# Patient Record
Sex: FEMALE | Race: BLACK OR AFRICAN AMERICAN | NOT HISPANIC OR LATINO | Employment: PART TIME | URBAN - METROPOLITAN AREA
[De-identification: names, ages, dates, MRNs, and addresses within clinical notes are randomized per-mention and may not be internally consistent; named-entity substitution may affect disease eponyms.]

---

## 2023-11-22 ENCOUNTER — OFFICE VISIT (OUTPATIENT)
Dept: FAMILY MEDICINE | Facility: CLINIC | Age: 33
End: 2023-11-22
Payer: COMMERCIAL

## 2023-11-22 VITALS
HEART RATE: 88 BPM | HEIGHT: 67 IN | RESPIRATION RATE: 23 BRPM | SYSTOLIC BLOOD PRESSURE: 123 MMHG | TEMPERATURE: 98.6 F | BODY MASS INDEX: 44.57 KG/M2 | WEIGHT: 284 LBS | OXYGEN SATURATION: 98 % | DIASTOLIC BLOOD PRESSURE: 76 MMHG

## 2023-11-22 DIAGNOSIS — Z13.220 SCREENING FOR LIPID DISORDERS: ICD-10-CM

## 2023-11-22 DIAGNOSIS — G89.29 CHRONIC PAIN OF LEFT KNEE: ICD-10-CM

## 2023-11-22 DIAGNOSIS — Z12.4 CERVICAL CANCER SCREENING: ICD-10-CM

## 2023-11-22 DIAGNOSIS — Z00.00 ROUTINE GENERAL MEDICAL EXAMINATION AT A HEALTH CARE FACILITY: Primary | ICD-10-CM

## 2023-11-22 DIAGNOSIS — Z11.59 NEED FOR HEPATITIS C SCREENING TEST: ICD-10-CM

## 2023-11-22 DIAGNOSIS — M25.562 CHRONIC PAIN OF LEFT KNEE: ICD-10-CM

## 2023-11-22 DIAGNOSIS — Z11.4 SCREENING FOR HIV (HUMAN IMMUNODEFICIENCY VIRUS): ICD-10-CM

## 2023-11-22 LAB
ALBUMIN SERPL BCG-MCNC: 4 G/DL (ref 3.5–5.2)
ALP SERPL-CCNC: 78 U/L (ref 40–150)
ALT SERPL W P-5'-P-CCNC: 20 U/L (ref 0–50)
ANION GAP SERPL CALCULATED.3IONS-SCNC: 8 MMOL/L (ref 7–15)
AST SERPL W P-5'-P-CCNC: 23 U/L (ref 0–45)
BILIRUB SERPL-MCNC: 0.4 MG/DL
BUN SERPL-MCNC: 9.3 MG/DL (ref 6–20)
CALCIUM SERPL-MCNC: 9.2 MG/DL (ref 8.6–10)
CHLORIDE SERPL-SCNC: 103 MMOL/L (ref 98–107)
CHOLEST SERPL-MCNC: 194 MG/DL
CREAT SERPL-MCNC: 0.87 MG/DL (ref 0.51–0.95)
DEPRECATED HCO3 PLAS-SCNC: 26 MMOL/L (ref 22–29)
EGFRCR SERPLBLD CKD-EPI 2021: 89 ML/MIN/1.73M2
ERYTHROCYTE [DISTWIDTH] IN BLOOD BY AUTOMATED COUNT: 13.1 % (ref 10–15)
GLUCOSE SERPL-MCNC: 82 MG/DL (ref 70–99)
HCT VFR BLD AUTO: 41.8 % (ref 35–47)
HCV AB SERPL QL IA: NONREACTIVE
HDLC SERPL-MCNC: 41 MG/DL
HGB BLD-MCNC: 13.1 G/DL (ref 11.7–15.7)
LDLC SERPL CALC-MCNC: 130 MG/DL
MCH RBC QN AUTO: 28.4 PG (ref 26.5–33)
MCHC RBC AUTO-ENTMCNC: 31.3 G/DL (ref 31.5–36.5)
MCV RBC AUTO: 91 FL (ref 78–100)
NONHDLC SERPL-MCNC: 153 MG/DL
PLATELET # BLD AUTO: 304 10E3/UL (ref 150–450)
POTASSIUM SERPL-SCNC: 3.9 MMOL/L (ref 3.4–5.3)
PROT SERPL-MCNC: 7.2 G/DL (ref 6.4–8.3)
RBC # BLD AUTO: 4.61 10E6/UL (ref 3.8–5.2)
SODIUM SERPL-SCNC: 137 MMOL/L (ref 135–145)
TRIGL SERPL-MCNC: 113 MG/DL
WBC # BLD AUTO: 5.7 10E3/UL (ref 4–11)

## 2023-11-22 PROCEDURE — 87389 HIV-1 AG W/HIV-1&-2 AB AG IA: CPT

## 2023-11-22 PROCEDURE — 99213 OFFICE O/P EST LOW 20 MIN: CPT | Mod: 25

## 2023-11-22 PROCEDURE — 87624 HPV HI-RISK TYP POOLED RSLT: CPT

## 2023-11-22 PROCEDURE — 80053 COMPREHEN METABOLIC PANEL: CPT

## 2023-11-22 PROCEDURE — 86803 HEPATITIS C AB TEST: CPT

## 2023-11-22 PROCEDURE — 99385 PREV VISIT NEW AGE 18-39: CPT

## 2023-11-22 PROCEDURE — 85027 COMPLETE CBC AUTOMATED: CPT

## 2023-11-22 PROCEDURE — G0145 SCR C/V CYTO,THINLAYER,RESCR: HCPCS

## 2023-11-22 PROCEDURE — 36415 COLL VENOUS BLD VENIPUNCTURE: CPT

## 2023-11-22 PROCEDURE — 80061 LIPID PANEL: CPT

## 2023-11-22 ASSESSMENT — ENCOUNTER SYMPTOMS
SORE THROAT: 0
DIARRHEA: 0
HEARTBURN: 0
NAUSEA: 0
DIZZINESS: 0
CHILLS: 0
ARTHRALGIAS: 1
ABDOMINAL PAIN: 0
SHORTNESS OF BREATH: 0
COUGH: 0
NERVOUS/ANXIOUS: 0
FEVER: 0
DYSURIA: 0
JOINT SWELLING: 0
MYALGIAS: 1
HEMATOCHEZIA: 0
HEMATURIA: 0
PALPITATIONS: 0
HEADACHES: 0
CONSTIPATION: 0
PARESTHESIAS: 0
BREAST MASS: 0
WEAKNESS: 0
FREQUENCY: 0
EYE PAIN: 0

## 2023-11-22 ASSESSMENT — PAIN SCALES - GENERAL: PAINLEVEL: SEVERE PAIN (7)

## 2023-11-22 NOTE — LETTER
November 27, 2023      Will Gomez  [address]   [place] [address]        Dear MsElva,    We are writing to inform you of your test results.    Will,  Your blood counts, electrolytes, kidney function, and liver function labs are all normal.    Your LDL cholesterol lab slightly elevated. Work on eating healthy fats (olive oil, avocados, fish) and increasing fiber in your diet. Foods like vegetables, fruit, omega-3 fatty acids and organic, lean proteins are important parts of a healthy diet. The more colorful your food is, the healthier it is for you.     Exercise will help with this too. It is recommended that adults exercise at least 150 minutes/week at continuous 10 minutes or more intervals of moderate physical activity. Consider including cardiovascular exercise (walking, running, dancing, etc.), resistance training (use of weights, resistance bands, body weight, etc.) and stretching into your exercise routine.         Resulted Orders   HIV Antigen Antibody Combo   Result Value Ref Range    HIV Antigen Antibody Combo Nonreactive Nonreactive      Comment:      HIV-1 p24 Ag & HIV-1/HIV-2 Ab Not Detected   Hepatitis C Screen Reflex to HCV RNA Quant and Genotype   Result Value Ref Range    Hepatitis C Antibody Nonreactive Nonreactive    Narrative    Assay performance characteristics have not been established for newborns, infants, and children.   Lipid panel reflex to direct LDL Fasting   Result Value Ref Range    Cholesterol 194 <200 mg/dL    Triglycerides 113 <150 mg/dL    Direct Measure HDL 41 (L) >=50 mg/dL    LDL Cholesterol Calculated 130 (H) <=100 mg/dL    Non HDL Cholesterol 153 (H) <130 mg/dL    Narrative    Cholesterol  Desirable:  <200 mg/dL    Triglycerides  Normal:  Less than 150 mg/dL  Borderline High:  150-199 mg/dL  High:  200-499 mg/dL  Very High:  Greater than or equal to 500 mg/dL    Direct Measure HDL  Female:  Greater than or equal to 50 mg/dL   Male:  Greater than  or equal to 40 mg/dL    LDL Cholesterol  Desirable:  <100mg/dL  Above Desirable:  100-129 mg/dL   Borderline High:  130-159 mg/dL   High:  160-189 mg/dL   Very High:  >= 190 mg/dL    Non HDL Cholesterol  Desirable:  130 mg/dL  Above Desirable:  130-159 mg/dL  Borderline High:  160-189 mg/dL  High:  190-219 mg/dL  Very High:  Greater than or equal to 220 mg/dL   Comprehensive metabolic panel (BMP + Alb, Alk Phos, ALT, AST, Total. Bili, TP)   Result Value Ref Range    Sodium 137 135 - 145 mmol/L      Comment:      Reference intervals for this test were updated on 09/26/2023 to more accurately reflect our healthy population. There may be differences in the flagging of prior results with similar values performed with this method. Interpretation of those prior results can be made in the context of the updated reference intervals.     Potassium 3.9 3.4 - 5.3 mmol/L    Carbon Dioxide (CO2) 26 22 - 29 mmol/L    Anion Gap 8 7 - 15 mmol/L    Urea Nitrogen 9.3 6.0 - 20.0 mg/dL    Creatinine 0.87 0.51 - 0.95 mg/dL    GFR Estimate 89 >60 mL/min/1.73m2    Calcium 9.2 8.6 - 10.0 mg/dL    Chloride 103 98 - 107 mmol/L    Glucose 82 70 - 99 mg/dL    Alkaline Phosphatase 78 40 - 150 U/L      Comment:      Reference intervals for this test were updated on 11/14/2023 to more accurately reflect our healthy population. There may be differences in the flagging of prior results with similar values performed with this method. Interpretation of those prior results can be made in the context of the updated reference intervals.    AST 23 0 - 45 U/L      Comment:      Reference intervals for this test were updated on 6/12/2023 to more accurately reflect our healthy population. There may be differences in the flagging of prior results with similar values performed with this method. Interpretation of those prior results can be made in the context of the updated reference intervals.    ALT 20 0 - 50 U/L      Comment:      Reference intervals for  this test were updated on 6/12/2023 to more accurately reflect our healthy population. There may be differences in the flagging of prior results with similar values performed with this method. Interpretation of those prior results can be made in the context of the updated reference intervals.      Protein Total 7.2 6.4 - 8.3 g/dL    Albumin 4.0 3.5 - 5.2 g/dL    Bilirubin Total 0.4 <=1.2 mg/dL   CBC with platelets   Result Value Ref Range    WBC Count 5.7 4.0 - 11.0 10e3/uL    RBC Count 4.61 3.80 - 5.20 10e6/uL    Hemoglobin 13.1 11.7 - 15.7 g/dL    Hematocrit 41.8 35.0 - 47.0 %    MCV 91 78 - 100 fL    MCH 28.4 26.5 - 33.0 pg    MCHC 31.3 (L) 31.5 - 36.5 g/dL    RDW 13.1 10.0 - 15.0 %    Platelet Count 304 150 - 450 10e3/uL       If you have any questions or concerns, please call the clinic at the number listed above.       Sincerely,      DANA Carpenter CNP

## 2023-11-22 NOTE — PROGRESS NOTES
SUBJECTIVE:   Will is a 33 year old, presenting for the following:  Annual Visit and Establish Care (Left knee issue.  It has been bothering her more consistently this last year.)        2023     9:57 AM   Additional Questions   Roomed by Maggie BARNES       Healthy Habits:     Getting at least 3 servings of Calcium per day:  Yes    Bi-annual eye exam:  Yes    Dental care twice a year:  Yes    Sleep apnea or symptoms of sleep apnea:  None    Diet:  Regular (no restrictions)    Frequency of exercise:  None    Taking medications regularly:  Yes    Medication side effects:  None    Additional concerns today:  Yes    Posterior Left knee pain, shoots down leg. Been hurting for over a year. Intermittent at first and now almost daily. Feels like knee might give out at times. Tried Naproxen. Unsure if heat/ice is helpful.     Works as caregiver, on feet all day and lifts frequently. 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       Today's PHQ-2 Score:       2023     9:50 AM   PHQ-2 (  [employer])   Q1: Little interest or pleasure in doing things 0   Q2: Feeling down, depressed or hopeless 0   PHQ-2 Score 0   Q1: Little interest or pleasure in doing things Not at all   Q2: Feeling down, depressed or hopeless Not at all   PHQ-2 Score 0       Have you ever done Advance Care Planning? (For example, a Health Directive, POLST, or a discussion with a medical provider or your loved ones about your wishes): No, advance care planning information given to patient to review.  Patient plans to discuss their wishes with loved ones or provider.      Social History     Tobacco Use    Smoking status: Former     Types: Cigarettes     Quit date:      Years since quittin.9    Smokeless tobacco: Never   Substance Use Topics    Alcohol use: Not on file           2023     9:50 AM   Alcohol Use   Prescreen: >3 drinks/day or >7 drinks/week? Not Applicable     Reviewed orders with patient.  Reviewed health maintenance and updated orders accordingly -  "Yes  Labs reviewed in EPIC  BP Readings from Last 3 Encounters:   23 123/76    Wt Readings from Last 3 Encounters:   23 128.8 kg (284 lb)                    Breast Cancer Screenin/22/2023     9:51 AM   Breast CA Risk Assessment (FHS-7)   Do you have a family history of breast, colon, or ovarian cancer? No / Unknown         History of abnormal Pap smear: NO - age 30-65 PAP every 5 years with negative HPV co-testing recommended     Reviewed and updated as needed this visit by clinical staff   Tobacco  Allergies  Meds              Reviewed and updated as needed this visit by Provider                   Review of Systems   Constitutional:  Negative for chills and fever.   HENT:  Negative for congestion, ear pain, hearing loss and sore throat.    Eyes:  Negative for pain and visual disturbance.   Respiratory:  Negative for cough and shortness of breath.    Cardiovascular:  Negative for chest pain, palpitations and peripheral edema.   Gastrointestinal:  Negative for abdominal pain, constipation, diarrhea, heartburn, hematochezia and nausea.   Breasts:  Negative for tenderness, breast mass and discharge.   Genitourinary:  Negative for dysuria, frequency, genital sores, hematuria, pelvic pain, urgency, vaginal bleeding and vaginal discharge.   Musculoskeletal:  Positive for arthralgias and myalgias. Negative for joint swelling.   Skin:  Negative for rash.   Neurological:  Negative for dizziness, weakness, headaches and paresthesias.   Psychiatric/Behavioral:  Negative for mood changes. The patient is not nervous/anxious.         OBJECTIVE:   /76 (BP Location: Left arm, Patient Position: Sitting, Cuff Size: Adult Large)   Pulse 88   Temp 98.6  F (37  C) (Tympanic)   Resp 23   Ht 1.702 m (5' 7\")   Wt 128.8 kg (284 lb)   LMP 2023   SpO2 98%   BMI 44.48 kg/m      Physical Exam  GENERAL: healthy, alert and no distress  EYES: Eyes grossly normal to inspection, PERRL and conjunctivae " and sclerae normal  HENT: ear canals and TM's normal, nose and mouth without ulcers or lesions  NECK: no adenopathy, no asymmetry, masses, or scars and thyroid normal to palpation  RESP: lungs clear to auscultation - no rales, rhonchi or wheezes  CV: regular rate and rhythm, normal S1 S2, no S3 or S4, no murmur, click or rub, no peripheral edema and peripheral pulses strong  ABDOMEN: soft, nontender, no hepatosplenomegaly, no masses and bowel sounds normal  MS: LLE exam shows no deformities, no erythema, induration, or nodules, no evidence of joint instability, and increased pain with knee flexion. Negative anterior/posterior drawer, Mcmurrays.   SKIN: no suspicious lesions or rashes  NEURO: Normal strength and tone, mentation intact and speech normal  PSYCH: mentation appears normal, affect normal/bright    Diagnostic Test Results:  Labs reviewed in Epic    ASSESSMENT/PLAN:   Will was seen today for annual visit and establish care.    Diagnoses and all orders for this visit:    1. Routine general medical examination at a health care facility  Preventative exam completed today. Discussed healthy lifestyle recommendations of getting 150 minutes of exercise weekly and eating a healthy diet. Reviewed and updated health maintenance. Pap/Labs completed today. Follow up in one year.    - REVIEW OF HEALTH MAINTENANCE PROTOCOL ORDERS  - Lipid panel reflex to direct LDL Fasting  - Comprehensive metabolic panel (BMP + Alb, Alk Phos, ALT, AST, Total. Bili, TP)  - CBC with platelets    2. Chronic pain of left knee  Ongoing for over a year. No known injury. Was intermittent and now more constant. Physical exam unremarkable. Discussed NSAIDS, ice, rest, supportive footwear when at work. Will obtain xray and consider PT referral.   - XR Knee Left 3 Views; Future    3. Cervical cancer screening  - Pap Screen with HPV - recommended age 30 - 65 years    4. Screening for lipid disorders  - Lipid panel reflex to direct LDL  "Fasting    5. Screening for HIV (human immunodeficiency virus)  - HIV Antigen Antibody Combo    6. Need for hepatitis C screening test  - Hepatitis C Screen Reflex to HCV RNA Quant and Genotype       Patient has been advised of split billing requirements and indicates understanding: Yes      COUNSELING:  Reviewed preventive health counseling, as reflected in patient instructions       Regular exercise       Healthy diet/nutrition       Consider Hep C screening for all patients one time for ages 18-79 years       HIV screeninx in teen years, 1x in adult years, and at intervals if high risk      BMI:   Estimated body mass index is 44.48 kg/m  as calculated from the following:    Height as of this encounter: 1.702 m (5' 7\").    Weight as of this encounter: 128.8 kg (284 lb).   Weight management plan: Discussed healthy diet and exercise guidelines      She reports that she quit smoking about 21 years ago. Her smoking use included cigarettes. She has never used smokeless tobacco.        DANA Carpenter CNP  [facility]  "

## 2023-11-24 LAB — HIV 1+2 AB+HIV1 P24 AG SERPL QL IA: NONREACTIVE

## 2023-11-27 ENCOUNTER — ANCILLARY PROCEDURE (OUTPATIENT)
Dept: GENERAL RADIOLOGY | Facility: CLINIC | Age: 33
End: 2023-11-27
Payer: COMMERCIAL

## 2023-11-27 DIAGNOSIS — G89.29 CHRONIC PAIN OF LEFT KNEE: ICD-10-CM

## 2023-11-27 DIAGNOSIS — M25.562 CHRONIC PAIN OF LEFT KNEE: ICD-10-CM

## 2023-11-27 LAB
BKR LAB AP GYN ADEQUACY: NORMAL
BKR LAB AP GYN INTERPRETATION: NORMAL
BKR LAB AP HPV REFLEX: NORMAL
BKR LAB AP PREVIOUS ABNORMAL: NORMAL
PATH REPORT.COMMENTS IMP SPEC: NORMAL
PATH REPORT.COMMENTS IMP SPEC: NORMAL
PATH REPORT.RELEVANT HX SPEC: NORMAL

## 2023-11-27 PROCEDURE — 73562 X-RAY EXAM OF KNEE 3: CPT | Mod: TC | Performed by: RADIOLOGY

## 2023-11-29 LAB
HUMAN PAPILLOMA VIRUS 16 DNA: NEGATIVE
HUMAN PAPILLOMA VIRUS 18 DNA: NEGATIVE
HUMAN PAPILLOMA VIRUS FINAL DIAGNOSIS: NORMAL
HUMAN PAPILLOMA VIRUS OTHER HR: NEGATIVE

## 2024-01-03 ENCOUNTER — TRANSFERRED RECORDS (OUTPATIENT)
Dept: HEALTH INFORMATION MANAGEMENT | Facility: CLINIC | Age: 34
End: 2024-01-03
Payer: COMMERCIAL

## 2024-02-06 ENCOUNTER — TRANSFERRED RECORDS (OUTPATIENT)
Dept: HEALTH INFORMATION MANAGEMENT | Facility: CLINIC | Age: 34
End: 2024-02-06
Payer: COMMERCIAL

## 2024-05-17 ENCOUNTER — OFFICE VISIT (OUTPATIENT)
Dept: FAMILY MEDICINE | Facility: CLINIC | Age: 34
End: 2024-05-17
Payer: COMMERCIAL

## 2024-05-17 VITALS
HEIGHT: 67 IN | SYSTOLIC BLOOD PRESSURE: 129 MMHG | HEART RATE: 100 BPM | DIASTOLIC BLOOD PRESSURE: 77 MMHG | OXYGEN SATURATION: 100 % | RESPIRATION RATE: 19 BRPM | BODY MASS INDEX: 45.99 KG/M2 | WEIGHT: 293 LBS | TEMPERATURE: 97.8 F

## 2024-05-17 DIAGNOSIS — G89.29 CHRONIC BILATERAL LOW BACK PAIN WITHOUT SCIATICA: Primary | ICD-10-CM

## 2024-05-17 DIAGNOSIS — G89.29 CHRONIC PAIN OF BOTH KNEES: ICD-10-CM

## 2024-05-17 DIAGNOSIS — M25.561 CHRONIC PAIN OF BOTH KNEES: ICD-10-CM

## 2024-05-17 DIAGNOSIS — M25.562 CHRONIC PAIN OF BOTH KNEES: ICD-10-CM

## 2024-05-17 DIAGNOSIS — M54.50 CHRONIC BILATERAL LOW BACK PAIN WITHOUT SCIATICA: Primary | ICD-10-CM

## 2024-05-17 PROBLEM — E66.01 MORBID OBESITY (H): Status: ACTIVE | Noted: 2019-08-29

## 2024-05-17 PROCEDURE — 99214 OFFICE O/P EST MOD 30 MIN: CPT | Performed by: NURSE PRACTITIONER

## 2024-05-17 RX ORDER — CYCLOBENZAPRINE HCL 5 MG
5 TABLET ORAL 3 TIMES DAILY PRN
Qty: 30 TABLET | Refills: 1 | Status: SHIPPED | OUTPATIENT
Start: 2024-05-17

## 2024-05-17 RX ORDER — CYCLOBENZAPRINE HCL 5 MG
5 TABLET ORAL 3 TIMES DAILY PRN
Qty: 30 TABLET | Refills: 1 | Status: SHIPPED | OUTPATIENT
Start: 2024-05-17 | End: 2024-05-17

## 2024-05-17 ASSESSMENT — PAIN SCALES - GENERAL: PAINLEVEL: EXTREME PAIN (8)

## 2024-05-17 NOTE — PROGRESS NOTES
"  Assessment & Plan     Chronic bilateral low back pain without sciatica  No red flags   Start PT, continue tylenol prn  - Physical Therapy  Referral  - cyclobenzaprine (FLEXERIL) 5 MG tablet  Dispense: 30 tablet; Refill: 1  - diclofenac (VOLTAREN) 1 % topical gel  Dispense: 100 g; Refill: 4    Chronic pain of both knees  - Physical Therapy  Referral  - diclofenac (VOLTAREN) 1 % topical gel  Dispense: 100 g; Refill: 4  Has been seen at [facility] for this- has undergone xray and MRI without significant structural findings. She had chondromalacia and synovitis of left knee. Did steroid injection in January which helped, symptoms are back and limping again. On last visit 2/6/24 PT was recommended  PT for 1 month- if worsening or not improving refer back to ortho  Also advised to loose weight-    Body mass index 45.0-49.9, adult (H)  - Adult Nutrition  Referral  -exercise guidelines were reviewed          BMI  Estimated body mass index is 46.52 kg/m  as calculated from the following:    Height as of this encounter: 1.702 m (5' 7\").    Weight as of this encounter: 134.7 kg (297 lb).   Weight management plan: Discussed healthy diet and exercise guidelines          Subjective   Will is a 33 year old, presenting for the following health issues:  Musculoskeletal Problem (Pain in knees and back- muscle pain.  She has been to therapy and has cortisone shots. She is unable to sleep.)        5/17/2024     2:32 PM   Additional Questions   Roomed by Maggie BARNES     Chronic left knee pain - ongoing for several years. No numbness of tingling, locking, giving out, or clicking.  Has been seen at [facility] for this- has undergone xray and MRI without significant structural findings. She had chondromalacia and synovitis. Has done steroid injection in January which helped, symptoms are back and limping again. - on last visit 2/6/24 PT was recommended-    Back pain - pulled a muscle on back about 1 month " "ago, but even before then had pain maybe 5 months. 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 Pain is worse in the morning, feels a lot of stiffness.   Taking tylenol and ibuprofen with temporary relief      History of Present Illness       Reason for visit:  Knee and muscle pain    She eats 0-1 servings of fruits and vegetables daily.She consumes 2 sweetened beverage(s) daily.She exercises with enough effort to increase her heart rate 9 or less minutes per day.  She exercises with enough effort to increase her heart rate 3 or less days per week.   She is taking medications regularly.                     Objective    /77 (BP Location: Left arm, Patient Position: Sitting, Cuff Size: Adult Regular)   Pulse 100   Temp 97.8  F (36.6  C) (Tympanic)   Resp 19   Ht 1.702 m (5' 7\")   Wt 134.7 kg (297 lb)   LMP  (LMP Unknown)   SpO2 100%   BMI 46.52 kg/m    Body mass index is 46.52 kg/m .  Physical Exam   GENERAL: alert and no distress  NECK: no adenopathy, no asymmetry, masses, or scars  RESP: lungs clear to auscultation - no rales, rhonchi or wheezes  CV: regular rate and rhythm, normal S1 S2, no S3 or S4, no murmur, click or rub, no peripheral edema  ABDOMEN: soft, nontender, no hepatosplenomegaly, no masses and bowel sounds normal  MS: no gross musculoskeletal defects noted, no edema  ORTHO: Knee Exam: Inspection: No effusion  Tender: patella tendon  Active Range of Motion: decreased flexion unable to fully bend knee   Strength: 4+  Special tests: negative Lachman's test, negative posterior drawer, negative Natalia's   Lumber/Thoracic Spine Exam: Tender:  lumbar spinous processes              Signed Electronically by: DANA Deluca CNP    "

## 2024-07-01 ENCOUNTER — THERAPY VISIT (OUTPATIENT)
Dept: PHYSICAL THERAPY | Facility: REHABILITATION | Age: 34
End: 2024-07-01
Attending: NURSE PRACTITIONER
Payer: COMMERCIAL

## 2024-07-01 DIAGNOSIS — G89.29 CHRONIC PAIN OF BOTH KNEES: ICD-10-CM

## 2024-07-01 DIAGNOSIS — M25.562 CHRONIC PAIN OF BOTH KNEES: ICD-10-CM

## 2024-07-01 DIAGNOSIS — G89.29 CHRONIC PAIN OF LEFT KNEE: Primary | ICD-10-CM

## 2024-07-01 DIAGNOSIS — G89.29 CHRONIC BILATERAL LOW BACK PAIN WITHOUT SCIATICA: ICD-10-CM

## 2024-07-01 DIAGNOSIS — M25.562 CHRONIC PAIN OF LEFT KNEE: Primary | ICD-10-CM

## 2024-07-01 DIAGNOSIS — M54.50 CHRONIC BILATERAL LOW BACK PAIN WITHOUT SCIATICA: ICD-10-CM

## 2024-07-01 DIAGNOSIS — M25.561 CHRONIC PAIN OF BOTH KNEES: ICD-10-CM

## 2024-07-01 PROCEDURE — 97110 THERAPEUTIC EXERCISES: CPT | Mod: GP | Performed by: PHYSICAL THERAPIST

## 2024-07-01 PROCEDURE — 97161 PT EVAL LOW COMPLEX 20 MIN: CPT | Mod: GP | Performed by: PHYSICAL THERAPIST

## 2024-07-01 ASSESSMENT — ACTIVITIES OF DAILY LIVING (ADL)
KNEE_ACTIVITY_OF_DAILY_LIVING_SUM: 43
WALK: ACTIVITY IS MINIMALLY DIFFICULT
LIMPING: THE SYMPTOM AFFECTS MY ACTIVITY MODERATELY
SIT WITH YOUR KNEE BENT: ACTIVITY IS VERY DIFFICULT
KNEE_ACTIVITY_OF_DAILY_LIVING_SCORE: 61.43
GIVING WAY, BUCKLING OR SHIFTING OF KNEE: I HAVE THE SYMPTOM BUT IT DOES NOT AFFECT MY ACTIVITY
STAND: ACTIVITY IS MINIMALLY DIFFICULT
LIMPING: THE SYMPTOM AFFECTS MY ACTIVITY MODERATELY
GO UP STAIRS: ACTIVITY IS MINIMALLY DIFFICULT
KNEEL ON THE FRONT OF YOUR KNEE: ACTIVITY IS FAIRLY DIFFICULT
PLEASE_INDICATE_YOR_PRIMARY_REASON_FOR_REFERRAL_TO_THERAPY:: KNEE
RISE FROM A CHAIR: ACTIVITY IS MINIMALLY DIFFICULT
PAIN: THE SYMPTOM AFFECTS MY ACTIVITY MODERATELY
SQUAT: ACTIVITY IS VERY DIFFICULT
GO DOWN STAIRS: ACTIVITY IS MINIMALLY DIFFICULT
STIFFNESS: I HAVE THE SYMPTOM BUT IT DOES NOT AFFECT MY ACTIVITY
WEAKNESS: I HAVE THE SYMPTOM BUT IT DOES NOT AFFECT MY ACTIVITY
WEAKNESS: I HAVE THE SYMPTOM BUT IT DOES NOT AFFECT MY ACTIVITY
STIFFNESS: I HAVE THE SYMPTOM BUT IT DOES NOT AFFECT MY ACTIVITY
WALK: ACTIVITY IS MINIMALLY DIFFICULT
SQUAT: ACTIVITY IS VERY DIFFICULT
PAIN: THE SYMPTOM AFFECTS MY ACTIVITY MODERATELY
GO UP STAIRS: ACTIVITY IS MINIMALLY DIFFICULT
KNEEL ON THE FRONT OF YOUR KNEE: ACTIVITY IS FAIRLY DIFFICULT
STAND: ACTIVITY IS MINIMALLY DIFFICULT
RAW_SCORE: 43
SWELLING: THE SYMPTOM AFFECTS MY ACTIVITY SLIGHTLY
RISE FROM A CHAIR: ACTIVITY IS MINIMALLY DIFFICULT
SWELLING: THE SYMPTOM AFFECTS MY ACTIVITY SLIGHTLY
GO DOWN STAIRS: ACTIVITY IS MINIMALLY DIFFICULT
GIVING WAY, BUCKLING OR SHIFTING OF KNEE: I HAVE THE SYMPTOM BUT IT DOES NOT AFFECT MY ACTIVITY
SIT WITH YOUR KNEE BENT: ACTIVITY IS VERY DIFFICULT

## 2024-07-01 NOTE — PROGRESS NOTES
PHYSICAL THERAPY EVALUATION  Type of Visit: Evaluation       Fall Risk Screen:  Fall screen completed by: PT  Have you fallen 2 or more times in the past year?: No  Have you fallen and had an injury in the past year?: No  Is patient a fall risk?: No    Subjective         Presenting condition or subjective complaint: Knee pain  The patient reports that her Left knee is very bothersome. Used to be on and off, but no is more constant. Hurts when she is lying down and sitting. Can't bend it or squat very effectively. Has tried ice/heat/numbing spray/diclofenac and everything is temporay. February 2024 had an injection and did feel like this was temporarily helpful for about 1 month. 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 Chronic low back pain, but the left knee is the priority right now.     Date of onset: 05/17/24 (MD order)    Relevant medical history:     Dates & types of surgery:      Prior diagnostic imaging/testing results: MRI     Prior therapy history for the same diagnosis, illness or injury: Yes        Living Environment  Social support:     Type of home: Apartment/condo   Stairs to enter the home: Yes 4 Is there a railing: Yes     Ramp: No   Stairs inside the home: No       Help at home:    Equipment owned:       Employment: Yes Caregiver  Hobbies/Interests:      Patient goals for therapy:   walk, stairs, sit and sleep better.     Pain assessment: See objective evaluation for additional pain details     Objective     KNEE:    Standing Posture: avoids Knee extension in WB    Gait: mild anatlgia, lacks TKE, walks with knee bent,     Functional:   - Squat: DL squat, limited ROM, painful anterior knee, anterior knee translation.          AROM: (* indicates patient's pain)   PROM:(over pressure)   L  R L R   Hyperextension         Extension   Lacking 8 deg P+  Lacking 5 deg     Flexion   75 deg P+   85 deg P+      Strength:   L R   HIP     Flex 3+ 3+   Ext 3+ 3+   ABd 3+ 3+   KNEE     Flex  4 4   Ext 3+ 3+         Special tests:   L R   Sweep Test     Anterior Drawer     Dial Test     Posterior Drawer     Lachman's neg neg   Valgus 0 degrees     Valgus 30 degrees     Varus 0 degrees     Varus 30 degrees     Natalia's neg neg   Appley's pos neg   Lateral Compression     Patellar Compression     SLR     Slump         Palpation: tender to patellofemoral joint medial and lateral    Patellar tracking: pain with seated LAQ, improved pain with hand pressure over patella. Good candidate for Parra taping. See flowsheet for details     Apprehension to movement of L knee     Assessment & Plan   CLINICAL IMPRESSIONS  Medical Diagnosis: Left Knee Pain    Treatment Diagnosis: Left Knee Pain   Impression/Assessment: Patient is a 33 year old female with knee complaints.  The following significant findings have been identified: Pain, Decreased ROM/flexibility, Decreased joint mobility, Decreased strength, Inflammation, Impaired gait, Impaired muscle performance, Decreased activity tolerance, and Impaired posture. These impairments interfere with their ability to perform self care tasks, work tasks, recreational activities, household chores, driving , household mobility, and community mobility as compared to previous level of function.     Clinical Decision Making (Complexity):  Clinical Presentation: Stable/Uncomplicated  Clinical Presentation Rationale: based on medical and personal factors listed in PT evaluation  Clinical Decision Making (Complexity): Low complexity    PLAN OF CARE  Treatment Interventions:  Modalities: Cryotherapy, E-stim, Hot Pack, taping  Interventions: Gait Training, Manual Therapy, Neuromuscular Re-education, Therapeutic Activity, Therapeutic Exercise, Self-Care/Home Management    Long Term Goals     PT Goal 1  Goal Identifier: Ambulation  Goal Description: The patient will be able to ambulate x30 minutes with pain <3/10.  Rationale: to maximize safety and independence with performance of  ADLs and functional tasks;to maximize safety and independence within the home;to maximize safety and independence within the community  Target Date: 09/23/24  PT Goal 2  Goal Identifier: Stairs  Goal Description: The patient will be able to negotiate a flight of stairs with reciprocol gait pattern and pain <3/10.  Rationale: to maximize safety and independence with performance of ADLs and functional tasks;to maximize safety and independence within the home;to maximize safety and independence within the community  Target Date: 09/23/24      Frequency of Treatment: 1x/week tapering to 1x every other week  Duration of Treatment: 12 weeks    Recommended Referrals to Other Professionals:   Education Assessment:   Learner/Method: Patient  Education Comments: Eager to participate in therapy    Risks and benefits of evaluation/treatment have been explained.   Patient/Family/caregiver agrees with Plan of Care.     Evaluation Time:     PT Eval, Low Complexity Minutes (91700): 15       Signing Clinician: Sissy Vo, PT        [facility]                                                                                   OUTPATIENT PHYSICAL THERAPY      PLAN OF TREATMENT FOR OUTPATIENT REHABILITATION   Patient's Last Name, First Name, Will Fairchild YOB: 1990   Provider's Name   [facility]   Medical Record No.  [id number]     Onset Date: 05/17/24 (MD order)  Start of Care Date: 07/01/24     Medical Diagnosis:  Left Knee Pain      PT Treatment Diagnosis:  Left Knee Pain Plan of Treatment  Frequency/Duration: 1x/week tapering to 1x every other week/ 12 weeks    Certification date from 07/01/24 to 09/23/24         See note for plan of treatment details and functional goals     Sissy Vo, PT                         I CERTIFY THE NEED FOR THESE SERVICES FURNISHED UNDER        THIS PLAN OF TREATMENT AND WHILE UNDER MY CARE      (Physician attestation of this document indicates review and certification of the therapy plan).              Referring Provider:  Julia Ferro    Initial Assessment  See Epic Evaluation- Start of Care Date: 07/01/24

## 2024-07-22 ENCOUNTER — THERAPY VISIT (OUTPATIENT)
Dept: PHYSICAL THERAPY | Facility: REHABILITATION | Age: 34
End: 2024-07-22
Payer: COMMERCIAL

## 2024-07-22 DIAGNOSIS — M25.562 CHRONIC PAIN OF LEFT KNEE: Primary | ICD-10-CM

## 2024-07-22 DIAGNOSIS — G89.29 CHRONIC PAIN OF LEFT KNEE: Primary | ICD-10-CM

## 2024-07-22 PROCEDURE — 97110 THERAPEUTIC EXERCISES: CPT | Mod: GP | Performed by: PHYSICAL THERAPIST

## 2024-07-22 PROCEDURE — 97140 MANUAL THERAPY 1/> REGIONS: CPT | Mod: GP | Performed by: PHYSICAL THERAPIST

## 2024-08-12 ENCOUNTER — TELEPHONE (OUTPATIENT)
Dept: PHYSICAL THERAPY | Facility: REHABILITATION | Age: 34
End: 2024-08-12

## 2024-08-12 NOTE — TELEPHONE ENCOUNTER
Will Gomez was contacted after their no-show (missed visit without notification) on 8/12/24.  Reviewed no show and late cancel policy and will be cancelling future visits.

## 2024-10-23 ENCOUNTER — PATIENT OUTREACH (OUTPATIENT)
Dept: CARE COORDINATION | Facility: CLINIC | Age: 34
End: 2024-10-23
Payer: COMMERCIAL

## 2024-11-06 ENCOUNTER — PATIENT OUTREACH (OUTPATIENT)
Dept: CARE COORDINATION | Facility: CLINIC | Age: 34
End: 2024-11-06
Payer: COMMERCIAL

## 2025-01-19 ENCOUNTER — HEALTH MAINTENANCE LETTER (OUTPATIENT)
Age: 35
End: 2025-01-19

## 2025-03-26 ENCOUNTER — OFFICE VISIT (OUTPATIENT)
Dept: FAMILY MEDICINE | Facility: CLINIC | Age: 35
End: 2025-03-26
Payer: COMMERCIAL

## 2025-03-26 VITALS
OXYGEN SATURATION: 98 % | TEMPERATURE: 97.8 F | RESPIRATION RATE: 19 BRPM | HEART RATE: 80 BPM | DIASTOLIC BLOOD PRESSURE: 72 MMHG | SYSTOLIC BLOOD PRESSURE: 111 MMHG

## 2025-03-26 DIAGNOSIS — M25.571 ACUTE RIGHT ANKLE PAIN: Primary | ICD-10-CM

## 2025-03-26 PROCEDURE — 3074F SYST BP LT 130 MM HG: CPT

## 2025-03-26 PROCEDURE — 1125F AMNT PAIN NOTED PAIN PRSNT: CPT

## 2025-03-26 PROCEDURE — 99213 OFFICE O/P EST LOW 20 MIN: CPT

## 2025-03-26 PROCEDURE — 3078F DIAST BP <80 MM HG: CPT

## 2025-03-26 PROCEDURE — G2211 COMPLEX E/M VISIT ADD ON: HCPCS

## 2025-03-26 ASSESSMENT — PAIN SCALES - GENERAL: PAINLEVEL_OUTOF10: MODERATE PAIN (4)

## 2025-03-26 NOTE — PROGRESS NOTES
Assessment & Plan     (M25.571) Acute right ankle pain  (primary encounter diagnosis)  Comment: Acute and stable.  No concerns for septic joint, ligamentous injury, or findings that would warrant the need for more immediate medical attention.  Pain seems to continue to represent overuse strain versus arthritis of the ankle.  Had ongoing discussion with patient during visit but it is most likely that we just have not given it enough time with the current plan of care to see significant improvement.  Options for escalation in therapy would include physical therapy referral, but based on physical exam findings, x-ray results, and current symptom status, I would not recommend a referral to an orthopedic specialist at this time.  Patient does attest to needing to be more consistent with the plan of care, and is very open to physical therapy at this time.  Placing referral, and offered reeducation surrounding care management moving forward.  I would like her to wear her ankle brace when she is up and moving around more, and especially at work.  She can utilize Tylenol and ibuprofen around-the-clock for pain management, and would also recommend more consistent icing and elevation when she is off of her feet. Offered education on medications including appropriate dosing, possible side effects, and possible adverse effects.  Education given on return to clinic instructions as well as alarm signs that would require the need for immediate medical attention.  Patient attested to understanding.  Plan: Physical Therapy  Referral      This progress note has been dictated, with use of voice recognition software. Any grammatical, typographical, or context errors are unintentional and inherent to use of voice recognition software.     I spent a total of 14 minutes on the day of the visit.   Time spent by me today doing chart review, history and exam, documentation and further activities per the note    MED REC  REQUIRED  Post Medication Reconciliation Status:       FUTURE APPOINTMENTS:       - Follow-up visit in 2 to 4 weeks if symptoms are not improving       - Follow-up with physical therapy       - Follow-up for annual visit or as needed  Patient Instructions   -Rest: Is important to allow your body to rest while it heals.  Though I do not recommend full immobilization, I do want you to do your best to continue on with normal daily activity in a gentle manner.  Please try to avoid any overexertion, or specific activities that seem to aggravate the pain  -Ice: You can apply ice to the area for 15 to 20 minutes at a time a number of times a day as you tolerate.  Ice is generally best in the first 1 to 2 weeks after injury, as it can be helpful not only to manage pain, but also to reduce inflammation.  If you find that heat is more soothing to the area, you can also utilize a warm pack in the same manner.  -Compression: If you are experiencing significant swelling, you can use an Ace wrap, or soft brace to apply compression to the area.  This can provide stability, reduce swelling, and occasionally manage excess pain in the affected area.  -Elevation: When you are able to be sitting and resting, please elevate the affected extremity your heart level.  This helps to allow blood to flow back to the heart, and to reduce excess swelling.  -Pain Management: You can utilize over-the-counter ibuprofen and Tylenol as you tolerate, and as the over-the-counter packaging describes to manage pain.  If you have been told by a provider in the past that it is not safe for you to take either of these medications, please abide by that recommendation and continue to avoid them moving forward.    I am placing a referral to PT    Please seek immediate medical attention with symptoms including severe pain or swelling, loss of strength or sensation, fever, chills, body aches, nausea and vomiting, or inability to move the affected  extremity      Subjective   Will is a 34 year old, presenting for the following health issues:  Hospital F/U (Patient is here following up from visit on 3/19/2025 to [facility] for R twisted ankle. Ankle pain is still there, it's a sharp pain, she works on her feet. Reports that she never injured her ankle but it just started hurting 3/19/25. )        3/26/2025     7:52 AM   Additional Questions   Roomed by Mikayla   Accompanied by daughter Michelle         3/26/2025   Declines Weight   Did patient decline having their weight taken? Yes         3/26/2025     7:52 AM   Patient Reported Additional Medications   Patient reports taking the following new medications none     HPI      ED/UC Followup:    Facility:  [facility]  Date of visit: 3/19/25  Reason for visit: ankle pain  Current Status: still having pain    Will is a 34-year-old female with a past medical history significant for obesity and chronic left knee pain who presents today to follow-up regarding ankle discomfort.  Patient was seen in the urgent care on March 19 with spontaneous new onset right sided medial ankle pain.  An x-ray was done at this time, which appreciated no concerns for soft tissue infection, fracture, or dislocation.  There is a small Achilles bone spur noted, and some more chronic degeneration, but outside of this the x-ray was largely stable.  She was recommended to utilize rest, compression with a brace, ice, elevation, and over-the-counter pain medication for management.  She reports that she has been utilizing these resources sparingly since that time, but presents today with concerns that pain has not resolved.  Patient works in , so she is on her feet for long hours of the day.  She reports that she is still able to do her job, though it causes increased discomfort in the ankle, and by the last 2 hours of her shift, the pain seems more severe.  She reports that pain is currently constant  at the medial aspect of her right ankle, but it is surely worse at certain.  Such as in the morning right when she wakes up, and after excessive use.  She describes the pain as aching more consistently, but sharp and stabbing acutely.  She declines any swelling, heat, redness, numbness, tingling, or weakness.  She declines that the pain radiates down into her foot or up her leg.  She is not experiencing any full body symptom such as fever, chills, body aches, or vomiting.  She declines any trauma to the ankle that she recalls.        Review of Systems  Constitutional, HEENT, cardiovascular, pulmonary, gi and gu systems are negative, except as otherwise noted.      Objective    /72 (BP Location: Left arm, Patient Position: Sitting, Cuff Size: Adult Large)   Pulse 80   Temp 97.8  F (36.6  C) (Temporal)   Resp 19   LMP 03/07/2025 (Approximate)   SpO2 98%   Breastfeeding No   There is no height or weight on file to calculate BMI.  Physical Exam   GENERAL: alert and no distress  NECK: no adenopathy, no asymmetry, masses, or scars  RESP: lungs clear to auscultation - no rales, rhonchi or wheezes  CV: regular rate and rhythm, normal S1 S2, no S3 or S4, no murmur, click or rub, no peripheral edema  ABDOMEN: soft, nontender, no hepatosplenomegaly, no masses and bowel sounds normal  MS: No edema, erythema, or point tenderness.  Range of motion, strength, and sensation intact in right ankle, foot, and knee.  Pain reproduced to inversion and eversion    Mahogany Saul DNP FNP-C  Family Nurse Practitioner - Same Day Provider  [facility] [place]        Signed Electronically by: DANA Bailey CNP

## 2025-03-26 NOTE — PATIENT INSTRUCTIONS
-Rest: Is important to allow your body to rest while it heals.  Though I do not recommend full immobilization, I do want you to do your best to continue on with normal daily activity in a gentle manner.  Please try to avoid any overexertion, or specific activities that seem to aggravate the pain  -Ice: You can apply ice to the area for 15 to 20 minutes at a time a number of times a day as you tolerate.  Ice is generally best in the first 1 to 2 weeks after injury, as it can be helpful not only to manage pain, but also to reduce inflammation.  If you find that heat is more soothing to the area, you can also utilize a warm pack in the same manner.  -Compression: If you are experiencing significant swelling, you can use an Ace wrap, or soft brace to apply compression to the area.  This can provide stability, reduce swelling, and occasionally manage excess pain in the affected area.  -Elevation: When you are able to be sitting and resting, please elevate the affected extremity your heart level.  This helps to allow blood to flow back to the heart, and to reduce excess swelling.  -Pain Management: You can utilize over-the-counter ibuprofen and Tylenol as you tolerate, and as the over-the-counter packaging describes to manage pain.  If you have been told by a provider in the past that it is not safe for you to take either of these medications, please abide by that recommendation and continue to avoid them moving forward.    I am placing a referral to PT    Please seek immediate medical attention with symptoms including severe pain or swelling, loss of strength or sensation, fever, chills, body aches, nausea and vomiting, or inability to move the affected extremity

## 2025-04-09 ENCOUNTER — TELEPHONE (OUTPATIENT)
Dept: FAMILY MEDICINE | Facility: CLINIC | Age: 35
End: 2025-04-09
Payer: COMMERCIAL

## 2025-04-09 DIAGNOSIS — M25.571 ACUTE RIGHT ANKLE PAIN: Primary | ICD-10-CM

## 2025-04-09 NOTE — TELEPHONE ENCOUNTER
Order/Referral Request    Who is requesting: pt    Orders being requested: more imaging for sprained ankle    Reason service is needed/diagnosis: sprained ankle    When are orders needed by: asap    Has this been discussed with Provider: Yes    Does patient have a preference on a Group/Provider/Facility? [facility]    Does patient have an appointment scheduled?: No    Where to send orders: Place orders within Epic    Could we send this information to you in [facility] or would you prefer to receive a phone call?:   Patient would prefer a phone call   Okay to leave a detailed message?: Yes at Cell number on file:    Telephone Information:   Mobile [phone]

## 2025-04-10 NOTE — TELEPHONE ENCOUNTER
Patient returned call to clinic. Message from provider relayed. Patient is appreciative for the referral     Erik Fortune RN    I am covering for Mahogany   Please notify   In the absence of trauma repeating an x ray is not indicated.   With the persistency of symptoms, I am happy to refer her to orthopedics - maybe they will need to do soft tissue imaging.  I will place the referral.     Thank you     Julia Ferro, CNP

## 2025-04-10 NOTE — TELEPHONE ENCOUNTER
Recent visit with Mahogany was on 3/26/25, at that visit PT referral was placed however patient indicates that due to recent work schedule she has not been able to arrange these appts.     Patient reports persistent pain, she has been using right foot brace, icing and resting along with alternating ibuprofen and tylenol as needed.      Initial foot x-ray completed with Ashok on 3/19/25 (reports viewable care eveywhere).     Patient is wondering if provider is willing to reorder another xray to recheck at this time.

## 2025-04-15 NOTE — TELEPHONE ENCOUNTER
DIAGNOSIS: Acute right ankle pain, Shahab Ferro    APPOINTMENT DATE: 4/17/25   NOTES STATUS DETAILS   OFFICE NOTE from referring provider Internal 4/9/25 - Julia Ferro APRN CNP    OFFICE NOTE from other specialist CE 3/19/25 - Uche Dove MD    3/26/25 - Mahogany Saul APRN CNP   MEDICATION LIST Internal    IMAGES     XRAYS (IMAGES & REPORTS) CE 3/19/25 - XR Ankle Rt

## 2025-04-17 ENCOUNTER — OFFICE VISIT (OUTPATIENT)
Dept: ORTHOPEDICS | Facility: CLINIC | Age: 35
End: 2025-04-17
Attending: NURSE PRACTITIONER
Payer: COMMERCIAL

## 2025-04-17 ENCOUNTER — PRE VISIT (OUTPATIENT)
Dept: ORTHOPEDICS | Facility: CLINIC | Age: 35
End: 2025-04-17

## 2025-04-17 DIAGNOSIS — M25.571 ACUTE RIGHT ANKLE PAIN: ICD-10-CM

## 2025-04-17 NOTE — PROGRESS NOTES
[facility]  SPORTS & ORTHOPEDIC CLINIC VISIT     Apr 17, 2025        ASSESSMENT & PLAN    34-year-old with medial right ankle pain consistent with posterior tibial tendinitis    Reviewed imaging and assessment with patient in detail  Discussed footwear and for modifications.  Provided with Superfeet inserts.  May consider referral to orthotics in the future.  We discussed use of ankle brace and/or taping techniques for the arch.  - Provided with home exercises.  Encouraged follow-up with physical therapy  We briefly discussed dedicated period of rest/immobilization.  Would like to avoid for now though if symptoms worsen we would consider this in the futur  Recommended use of topical diclofenac twice daily consistently for the next week.  Recommend follow up in clinic in 6 weeks    Ministerio Woody MD  [facility] SPORTS MEDICINE CLINIC [place]    -----  Chief Complaint   Patient presents with    Right Ankle - Pain       SUBJECTIVE  Will Gomez is a/an 34 year old female who is seen as an UC and PCP referral for evaluation of  right ankle pain.     The patient is seen by themselves.  The patient is Right handed    Onset: 1.5 month(s) ago. Patient describes injury as having constant pain in her ankle with no relief.   Location of Pain: right medial ankle   Worsened by: Walking, at rest, sitting   Better with: NA  Treatments tried: ice, elevating, voltaren, heat, ankle brace, ibuprofen, tylenol, naproxen, muscle relaxer  Associated symptoms: Tender to touch    Orthopedic/Surgical history: NO  Social History/Occupation: [employer]       REVIEW OF SYSTEMS:  Do you have fever, chills, weight loss? No  Do you have any vision problems? No  Do you have any chest pain or edema? No  Do you have any shortness of breath or wheezing?  No  Do you have stomach problems? No  Do you have any numbness or focal weakness? No  Do you have diabetes? No  Do you have problems with bleeding or clotting?  No  Do you have an rashes or other skin lesions? No    OBJECTIVE:  LMP 03/07/2025 (Approximate)      General: Alert, pleasant, no distress  Right ankle: warm, well perfused, SILT throughout     Inspection: No swelling ecchymosis or deformity.  Pes planus bilaterally noted     ROM: Symmetric.  Pain with passive dorsiflexion and eversion     Strength: Intact.  Has pain with heel raise but is able to perform heel raise without weakness.     Palpation: TTP inferior to the medial malleolus and along the course of posterior tibial tendon to the medial arch.  No tenderness of the tibiotalar joint or lateral ankle     Special Tests: None      RADIOLOGY:    3 view xrays of right ankle performed 3/19/2025 and reviewed independently demonstrating no acute findings.  Mild DJD of the tibiotalar joint.  Os trigonum present.  Small ossicle over the medial malleolus.  Possibly from remote trauma. See EMR for formal radiology report.

## 2025-04-17 NOTE — LETTER
4/17/2025      RE: Will Gomez  [address]   [address]  [place] [address]     Dear Colleague,    Thank you for referring your patient, Will Gomez, to the [facility] SPORTS MEDICINE [facility]. Please see a copy of my visit note below.      [facility]  SPORTS & ORTHOPEDIC CLINIC VISIT     Apr 17, 2025        ASSESSMENT & PLAN    34-year-old with medial right ankle pain consistent with posterior tibial tendinitis    Reviewed imaging and assessment with patient in detail  Discussed footwear and for modifications.  Provided with Superfeet inserts.  May consider referral to orthotics in the future.  We discussed use of ankle brace and/or taping techniques for the arch.  - Provided with home exercises.  Encouraged follow-up with physical therapy  We briefly discussed dedicated period of rest/immobilization.  Would like to avoid for now though if symptoms worsen we would consider this in the futur  Recommended use of topical diclofenac twice daily consistently for the next week.  Recommend follow up in clinic in 6 weeks    Ministerio Woody MD  [facility] SPORTS MEDICINE [facility]    -----  Chief Complaint   Patient presents with     Right Ankle - Pain       SUBJECTIVE  Will Gomez is a/an 34 year old female who is seen as an UC and PCP referral for evaluation of  right ankle pain.     The patient is seen by themselves.  The patient is Right handed    Onset: 1.5 month(s) ago. Patient describes injury as having constant pain in her ankle with no relief.   Location of Pain: right medial ankle   Worsened by: Walking, at rest, sitting   Better with: NA  Treatments tried: ice, elevating, voltaren, heat, ankle brace, ibuprofen, tylenol, naproxen, muscle relaxer  Associated symptoms: Tender to touch    Orthopedic/Surgical history: NO  Social History/Occupation: [employer]       REVIEW OF SYSTEMS:  Do you have fever, chills, weight loss? No  Do you have any  vision problems? No  Do you have any chest pain or edema? No  Do you have any shortness of breath or wheezing?  No  Do you have stomach problems? No  Do you have any numbness or focal weakness? No  Do you have diabetes? No  Do you have problems with bleeding or clotting? No  Do you have an rashes or other skin lesions? No    OBJECTIVE:  [facility] 03/07/2025 (Approximate)      General: Alert, pleasant, no distress  Right ankle: warm, well perfused, SILT throughout     Inspection: No swelling ecchymosis or deformity.  Pes planus bilaterally noted     ROM: Symmetric.  Pain with passive dorsiflexion and eversion     Strength: Intact.  Has pain with heel raise but is able to perform heel raise without weakness.     Palpation: TTP inferior to the medial malleolus and along the course of posterior tibial tendon to the medial arch.  No tenderness of the tibiotalar joint or lateral ankle     Special Tests: None      RADIOLOGY:    3 view xrays of right ankle performed 3/19/2025 and reviewed independently demonstrating no acute findings.  Mild DJD of the tibiotalar joint.  Os trigonum present.  Small ossicle over the medial malleolus.  Possibly from remote trauma. See EMR for formal radiology report.              Again, thank you for allowing me to participate in the care of your patient.      Sincerely,    Ministerio Woody MD

## 2025-05-21 ENCOUNTER — PATIENT OUTREACH (OUTPATIENT)
Dept: CARE COORDINATION | Facility: CLINIC | Age: 35
End: 2025-05-21
Payer: COMMERCIAL